# Patient Record
Sex: FEMALE | NOT HISPANIC OR LATINO | URBAN - METROPOLITAN AREA
[De-identification: names, ages, dates, MRNs, and addresses within clinical notes are randomized per-mention and may not be internally consistent; named-entity substitution may affect disease eponyms.]

---

## 2019-12-05 ENCOUNTER — HOSPITAL ENCOUNTER (OUTPATIENT)
Dept: ONCOLOGY | Facility: HOSPITAL | Age: 49
Discharge: HOME | End: 2019-12-05
Attending: INTERNAL MEDICINE

## 2019-12-05 ENCOUNTER — OFFICE VISIT CONVERTED (OUTPATIENT)
Dept: ONCOLOGY | Facility: HOSPITAL | Age: 49
End: 2019-12-05
Attending: INTERNAL MEDICINE

## 2019-12-10 ENCOUNTER — HOSPITAL ENCOUNTER (OUTPATIENT)
Dept: ULTRASOUND IMAGING | Facility: HOSPITAL | Age: 49
Discharge: HOME | End: 2019-12-10
Attending: INTERNAL MEDICINE

## 2019-12-11 ENCOUNTER — HOSPITAL ENCOUNTER (OUTPATIENT)
Dept: ONCOLOGY | Facility: HOSPITAL | Age: 49
Discharge: HOME | End: 2019-12-11
Attending: INTERNAL MEDICINE

## 2019-12-11 ENCOUNTER — OFFICE VISIT CONVERTED (OUTPATIENT)
Dept: ONCOLOGY | Facility: HOSPITAL | Age: 49
End: 2019-12-11
Attending: INTERNAL MEDICINE

## 2019-12-18 ENCOUNTER — HOSPITAL ENCOUNTER (OUTPATIENT)
Dept: GASTROENTEROLOGY | Facility: HOSPITAL | Age: 49
Setting detail: HOSPITAL OUTPATIENT SURGERY
Discharge: HOME | End: 2019-12-18
Attending: INTERNAL MEDICINE

## 2021-05-28 VITALS
OXYGEN SATURATION: 99 % | HEIGHT: 65 IN | OXYGEN SATURATION: 98 % | WEIGHT: 159.17 LBS | HEART RATE: 93 BPM | HEART RATE: 65 BPM | SYSTOLIC BLOOD PRESSURE: 114 MMHG | WEIGHT: 159.39 LBS | TEMPERATURE: 98 F | DIASTOLIC BLOOD PRESSURE: 56 MMHG | TEMPERATURE: 98.1 F | BODY MASS INDEX: 26.52 KG/M2 | DIASTOLIC BLOOD PRESSURE: 45 MMHG | SYSTOLIC BLOOD PRESSURE: 116 MMHG | HEIGHT: 65 IN | BODY MASS INDEX: 26.56 KG/M2

## 2021-05-28 NOTE — PROGRESS NOTES
Patient: ELEAZAR MARTÍNEZ     Acct: [id number]     Report: #[id number]  UNIT #: [id number]     : 1970    Encounter Date:2019  PRIMARY CARE: GRIS SOMESR  ***Signed***  --------------------------------------------------------------------------------------------------------------------  NURSE INTAKE      Visit Type      Established Patient Visit            Chief Complaint      DEC WBC            Referring Provider/Copies To      Referring Provider:  KATHY STANLEY      Primary Care Provider:  GRIS SOMERS            History and Present Illness      Past History      Is a 49-year-old  woman with history of rheumatoid arthritis-    palindromic rheumatoid arthritis and hypothyroidism who presents with     longstanding mild leukopenia.      She states that she has intermittent flares of her rheumatoid arthritis and in      she was on Plaquenil and when she changed on the basis her new doctor     decided to give her a trial of methotrexate which she did not want because of     the side effects.  She does state that she intermittently does get flares and     currently uses tramadol on occasion.  She is not on any active therapy for     rheumatoid arthritis at this time.  She denies abdominal pain, loss of appetite,    loss of weight, chills, rigors, fevers.  She does state that her hands and feet     have been burning for the past 6 months.            I reviewed her laboratory investigations and they are as follows:      2019 rheumatoid factor I 58-high; anti-CCP greater than 250 elevated high      WBC 3.43 hemoglobin 14.2 hematocrit 42.8 platelet count 247,000 MCV 91.5 normal     differential      10/28/2019: B12 707, folate 7.60, thyroid function tests normal            PAST, FAMILY   Past Medical History      Past Medical History:  Arthritis, Thyroid Disease (hypo)      Other PMH:        Hypothyroidism into the arthritis      Hematology/Oncology (F):  Leukopenia, Skin Cancer             Past Surgical History      Appendectomy, Skin Cancer Removal            partial colectomy            Family History      Family History:  Breast Cancer (grandma), Cervical Cancer (mother), Prostate     Cancer (father), Skin Cancer (mother)            Social History      Lives independently:  No            Tobacco Use      Tobacco status:  Never smoker            Alcohol Use      Alcohol intake:  None            Substance Use      Substance use:  Denies use            REVIEW OF SYSTEMS      General:  Admits: Fatigue;          Denies: Appetite Change, Fever, Night Sweats, Weight Gain, Weight Loss      Eye:  Denies Blurred Vision, Denies Corrective Lenses, Denies Diplopia, Denies     Vision Changes      ENT:  Denies Headache, Denies Hearing Loss, Denies Hoarseness, Denies Sore     Throat      Cardiovascular:  Denies Chest Pain, Denies Palpitations      Respiratory:  Denies: Coughing Blood, Productive Cough, Shortness of Air,     Wheezing      Gastrointestinal:  Denies Bloody Stools, Denies Constipation, Denies Diarrhea,     Denies Nausea/Vomiting, Denies Problem Swallowing, Denies Unable to Control     Bowels      Genitourinary:  Denies Blood in Urine, Denies Incontinence, Denies Painful     Urination      Musculoskeletal:  Denies Back Pain; Admits Muscle Pain, Admits Painful Joints      Integumentary:  Admits Itching; Denies Lesions, Denies Rash      Other      red spots on skin      Neurologic:  Denies Dizziness, Denies Numbness\Tingling, Denies Seizures      Psychiatric:  Denies Anxiety, Denies Depression      Endocrine:  Denies Cold Intolerance, Denies Heat Intolerance      Hematologic/Lymphatic:  Denies Bruising, Denies Bleeding, Denies Enlarged Lymph     Nodes      Reproductive:  Denies: Menopause, Heavy Periods, Pregnant, Still Menstruating            VITAL SIGNS AND SCORES      Vitals      Height 5 ft 5.35 in / 166 cm      Weight 159 lbs 6.281 oz / 72.3 kg      BSA 1.80 m2      BMI 26.2 kg/m2      Temperature  98.0 F / 36.67 C - Temporal      Pulse 65      Blood Pressure 114/45 Sitting, Left Arm      Pulse Oximetry 99%, room air            Pain Score      Experiencing any pain?:  Yes      Pain Scale Used:  Numerical      Pain Intensity:  4            Fatigue Score      Experiencing any fatigue?:  Yes      Fatigue (0-10 scale):  8            EXAM      General appearance:  in no apparent distress, cooperative, appears stated age.      HEENT: No pallor, no icterus, oral mucosa moist      Neck: Supple, trachea central-not deviated      Lymph nodes: none palpable peripherally      Cardiovascular: S1-S2 heard, no murmurs, no rubs, no gallops.      Respiratory: Clear to auscultation bilaterally, no adventitious sounds      Abdomen/gastro: Soft, nontender, no palpable hepatosplenomegaly, bowel sounds     heard      Skin: No lesions, no rashes, no petechiae.      Extremities: No pedal edema, peripheral pulses felt, no clubbing            PREVENTION      Hx Influenza Vaccination:  Yes      Date Influenza Vaccine Given:  Nov 1, 2019      Influenza Vaccine Declined:  No      2 or More Falls Past Year?:  No      Fall Past Year with Injury?:  No      Hx Pneumococcal Vaccination:  No      Encouraged to follow-up with:  PCP regarding preventative exams.      Chart initiated by      RAGHU MURPHY CMA            ALLERGY/MEDS      Allergies      Coded Allergies:             No Known Drug Allergies (Verified  Allergy, 12/5/19)            Medications            Levothyroxine (Synthroid) 0.175 Mg      0.175 MG PO QDAY@07, #30 TAB 0 Refills         Reported         12/5/19       Tramadol (Tramadol) 50 Mg Tablet      50 MG PO Q4HR, TAB 0 Refills         Reported         12/5/19      Medications Reviewed:  Changes made to meds            IMPRESSION/PLAN      Diagnosis      Rheumatoid arthritis         Rheumatoid arthritis with positive rheumatoid factor, involving unspecified        site - M05.9         Rheumatoid arthritis location: unspecified  site         Rheumatoid factor presence: with rheumatoid factor            Leukocytopenia, unspecified - D72.819            Notes      Rheumatoid arthritis, hypothyroidism and leukopenia.  The leukopenia is likely     secondary to underlying autoimmune disease process.  It also could be related to    rheumatoid arthritis itself.  Obtain an ultrasound of the abdomen to look for     splenomegaly so this was complete.  For the syndrome.  Patient will return to     follow-up results of the ultrasound next week.  If all is fine, there would be     no further management and patient can go back to her primary      New Medications      * Tramadol 50 MG TABLET: 50 MG PO Q4HR      * Levothyroxine (Synthroid) 0.175 M.175 MG PO QDAY@07 #30            Patient Education      Patient Education Provided:  Yes            Electronically signed by ADELINA LOVELL  2019 11:57       Disclaimer: Converted document may not contain table formatting or lab diagrams. Please see [facility] for the authenticated document.

## 2021-05-28 NOTE — PROGRESS NOTES
Patient: YANIRA MARTÍNEZ     Acct: [id number]     Report: #[id number]  UNIT #: [id number]     : 1970    Encounter Date:2019  PRIMARY CARE: GRIS SOMERS  ***Signed***  --------------------------------------------------------------------------------------------------------------------  NURSE INTAKE      Visit Type      Established Patient Visit            Chief Complaint      DEC WBC            Referring Provider/Copies To      Referring Provider:  KATHY STANLEY      Primary Care Provider:  GRIS SOMERS            History and Present Illness      Past History      Yanira is a 49-year-old  woman with history of rheumatoid     arthritis-palindromic rheumatoid arthritis and hypothyroidism who presents with     longstanding mild leukopenia.      She states that she has intermittent flares of her rheumatoid arthritis and in      she was on Plaquenil and when she changed on the basis her new doctor     decided to give her a trial of methotrexate which she did not want because of     the side effects.  She does state that she intermittently does get flares and     currently uses tramadol on occasion.  She is not on any active therapy for     rheumatoid arthritis at this time.  She denies abdominal pain, loss of appetite,    loss of weight, chills, rigors, fevers.  She does state that her hands and feet     have been burning for the past 6 months.            I reviewed her laboratory investigations and they are as follows:      2019 rheumatoid factor I 58-high; anti-CCP greater than 250 elevated high      WBC 3.43 hemoglobin 14.2 hematocrit 42.8 platelet count 247,000 MCV 91.5 normal     differential      10/28/2019: B12 707, folate 7.60, thyroid function tests normal      USG Abdomen 12/10/2019: normal spleen size            PAST, FAMILY   Past Medical History      Past Medical History:  Arthritis, Thyroid Disease (hypo)      Other PMH:        Hypothyroidism into the arthritis       Hematology/Oncology (F):  Leukopenia, Skin Cancer            Past Surgical History      Appendectomy, Skin Cancer Removal            partial colectomy            Family History      Family History:  Breast Cancer (grandma), Cervical Cancer (mother), Prostate     Cancer (father), Skin Cancer (mother)            Social History      Lives independently:  No            Tobacco Use      Tobacco status:  Never smoker            Alcohol Use      Alcohol intake:  None            Substance Use      Substance use:  Denies use            REVIEW OF SYSTEMS      General:  Denies: Appetite Change, Fatigue, Fever, Night Sweats, Weight Gain,     Weight Loss      Eye:  Denies Blurred Vision, Denies Corrective Lenses, Denies Diplopia, Denies     Vision Changes      ENT:  Denies Headache, Denies Hearing Loss, Denies Hoarseness, Denies Sore     Throat      Cardiovascular:  Denies Chest Pain, Denies Palpitations      Respiratory:  Denies: Coughing Blood, Productive Cough, Shortness of Air,     Wheezing      Gastrointestinal:  Denies Bloody Stools, Denies Constipation, Denies Diarrhea,     Denies Nausea/Vomiting, Denies Problem Swallowing, Denies Unable to Control     Bowels      Genitourinary:  Denies Blood in Urine, Denies Incontinence, Denies Painful     Urination      Musculoskeletal:  Denies Back Pain, Denies Muscle Pain, Denies Painful Joints      Integumentary:  Denies Itching, Denies Lesions, Denies Rash      Neurologic:  Denies Dizziness, Denies Numbness\Tingling, Denies Seizures      Psychiatric:  Denies Anxiety, Denies Depression      Endocrine:  Denies Cold Intolerance, Denies Heat Intolerance      Hematologic/Lymphatic:  Denies Bruising, Denies Bleeding, Denies Enlarged Lymph     Nodes      Reproductive:  Denies: Menopause, Heavy Periods, Pregnant, Still Menstruating            VITAL SIGNS AND SCORES      Vitals      Height 5 ft 5.35 in / 166 cm      Weight 159 lbs 2.754 oz / 72.2 kg      BSA 1.80 m2      BMI 26.2 kg/m2       Temperature 98.1 F / 36.72 C - Temporal      Pulse 93      Blood Pressure 116/56 Sitting, Left Arm      Pulse Oximetry 98%, ROOM AIR            Pain Score      Experiencing any pain?:  No      Pain Scale Used:  Numerical      Pain Intensity:  0            Fatigue Score      Experiencing any fatigue?:  No      Fatigue (0-10 scale):  0 (none)            EXAM      General appearance:  in no apparent distress, cooperative, appears stated age.      HEENT: No pallor, no icterus, oral mucosa moist      Neck: Supple, trachea central-not deviated      Lymph nodes: none palpable peripherally      Cardiovascular: S1-S2 heard, no murmurs, no rubs, no gallops.      Respiratory: Clear to auscultation bilaterally, no adventitious sounds      Abdomen/gastro: Soft, nontender, no palpable hepatosplenomegaly, bowel sounds     heard      Skin: No lesions, no rashes, no petechiae.      Extremities: No pedal edema, peripheral pulses felt, no clubbing            PREVENTION      Hx Influenza Vaccination:  Yes      Date Influenza Vaccine Given:  Nov 1, 2019      Influenza Vaccine Declined:  No      2 or More Falls Past Year?:  No      Fall Past Year with Injury?:  No      Hx Pneumococcal Vaccination:  No      Encouraged to follow-up with:  PCP regarding preventative exams.      Chart initiated by      RAGHU MURPHY CMA            ALLERGY/MEDS      Allergies      Coded Allergies:             NO KNOWN DRUG ALLERGIES (Verified  Allergy, Unknown, 12/11/19)            Medications            Levothyroxine (Synthroid) 0.175 Mg      0.175 MG PO QDAY@07, #30 TAB 0 Refills         Reported         12/5/19       Tramadol (Tramadol) 50 Mg Tablet      50 MG PO Q4HR, TAB 0 Refills         Reported         12/5/19      Medications Reviewed:  No Changes made to meds            IMPRESSION/PLAN      Diagnosis      Leukopenia         Neutropenia, unspecified type - D70.9         Neutropenia type: unspecified            Rheumatoid arthritis - M06.9          Rheumatoid factor presence: with rheumatoid factor         Laterality: unspecified laterality            Notes      Rheumatoid arthritis, hypothyroidism and leukopenia.  The leukopenia is likely     secondary to underlying autoimmune disease process.  Ultrasound of the abdomen     shows a normal spleen size so this is not a contributing factor.  There is no     further work-up warranted from the hematological perspective.  Thank you very     much for referring this patient to our services and please do not hesitate to     reach out to us should the need arise in the future.            Patient Education      Patient Education Provided:  Yes            Electronically signed by ADELINA LOVELL  12/11/2019 09:07       Disclaimer: Converted document may not contain table formatting or lab diagrams. Please see [facility] for the authenticated document.